# Patient Record
Sex: MALE | Race: WHITE | NOT HISPANIC OR LATINO | Employment: OTHER | URBAN - METROPOLITAN AREA
[De-identification: names, ages, dates, MRNs, and addresses within clinical notes are randomized per-mention and may not be internally consistent; named-entity substitution may affect disease eponyms.]

---

## 2024-02-23 ENCOUNTER — APPOINTMENT (OUTPATIENT)
Dept: GENERAL RADIOLOGY | Facility: HOSPITAL | Age: 69
End: 2024-02-23
Payer: MEDICARE

## 2024-02-23 ENCOUNTER — HOSPITAL ENCOUNTER (EMERGENCY)
Facility: HOSPITAL | Age: 69
Discharge: HOME | End: 2024-02-23
Attending: EMERGENCY MEDICINE
Payer: MEDICARE

## 2024-02-23 VITALS
TEMPERATURE: 97.6 F | DIASTOLIC BLOOD PRESSURE: 97 MMHG | HEART RATE: 69 BPM | SYSTOLIC BLOOD PRESSURE: 152 MMHG | OXYGEN SATURATION: 97 % | RESPIRATION RATE: 16 BRPM

## 2024-02-23 DIAGNOSIS — M25.561 ACUTE PAIN OF RIGHT KNEE: Primary | ICD-10-CM

## 2024-02-23 PROCEDURE — 73560 X-RAY EXAM OF KNEE 1 OR 2: CPT

## 2024-02-23 PROCEDURE — 99283 EMERGENCY DEPT VISIT LOW MDM: CPT

## 2024-02-23 RX ORDER — TADALAFIL 5 MG/1
1 TABLET ORAL DAILY
COMMUNITY
Start: 2024-01-10

## 2024-02-23 RX ORDER — HYDROCODONE BITARTRATE AND ACETAMINOPHEN 5; 325 MG/1; MG/1
1 TABLET ORAL EVERY 6 HOURS PRN
Qty: 12 TABLET | Refills: 0 | Status: SHIPPED | OUTPATIENT
Start: 2024-02-23

## 2024-02-23 RX ORDER — OMEPRAZOLE 20 MG/1
1 CAPSULE, DELAYED RELEASE ORAL DAILY
COMMUNITY
Start: 2023-11-10

## 2024-02-23 RX ORDER — OXYCODONE HYDROCHLORIDE AND ACETAMINOPHEN 5; 325 MG/1; MG/1
1 TABLET ORAL ONCE
Status: COMPLETED | OUTPATIENT
Start: 2024-02-23 | End: 2024-02-23

## 2024-02-23 RX ORDER — UBIDECARENONE 75 MG
100 CAPSULE ORAL DAILY
COMMUNITY

## 2024-02-23 RX ORDER — VALSARTAN 160 MG/1
1 TABLET ORAL DAILY
COMMUNITY
Start: 2024-02-12

## 2024-02-23 RX ADMIN — OXYCODONE HYDROCHLORIDE AND ACETAMINOPHEN 1 TABLET: 5; 325 TABLET ORAL at 10:51

## 2024-02-23 NOTE — ED NOTES
Patient to ER via car from home for R leg pain    Patient reports he turned over in bed last night and heard something snap    Patient reports he can not stand on his leg

## 2024-02-23 NOTE — ED PROVIDER NOTES
EMERGENCY DEPARTMENT ENCOUNTER  Room Number:  14/14  PCP: Provider, No Known  Independent Historians: Patient and Family      HPI:  Chief Complaint: had concerns including Leg Pain.     A complete HPI/ROS/PMH/PSH/SH/FH are unobtainable due to: None    Chronic or social conditions impacting patient care (Social Determinants of Health): None      Context: Sj Genao is a 68 y.o. male with a medical history of GERD, hypertension who presents to the ED c/o acute right knee pain.  The patient reports that he developed right knee pain suddenly overnight.  He states he was in bed and he twisted his knee and felt a pop in his right knee.  He reports he has had severe pain in his right knee since then.  He denies any fall or injury.  He was laying in bed when this occurred.  He denies prior similar episodes.  He has not taken any medicine for his symptoms.  But makes it worse.  Immobilization seems to make it better.  He states that he was unable to walk due to the pain.  He states he felt a pop when this occurred.      Review of prior external notes (non-ED) -and- Review of prior external test results outside of this encounter:  Laboratory evaluation 5/8/2023 shows a BMP with an elevated creatinine of 1.35.  CBC was normal on the same date.    Prescription drug monitoring program review: SREE reviewed by Ishan Villanueva MD       PAST MEDICAL HISTORY  Active Ambulatory Problems     Diagnosis Date Noted    No Active Ambulatory Problems     Resolved Ambulatory Problems     Diagnosis Date Noted    No Resolved Ambulatory Problems     Past Medical History:   Diagnosis Date    Hypertension          PAST SURGICAL HISTORY  History reviewed. No pertinent surgical history.      FAMILY HISTORY  History reviewed. No pertinent family history.      SOCIAL HISTORY  Social History     Socioeconomic History    Marital status:          ALLERGIES  Ciprofloxacin, Shellfish allergy, and Sulfa antibiotics      REVIEW OF  SYSTEMS  Review of Systems  Included in HPI  All systems reviewed and negative except for those discussed in HPI.      PHYSICAL EXAM    I have reviewed the triage vital signs and nursing notes.    ED Triage Vitals [02/23/24 1032]   Temp Heart Rate Resp BP SpO2   97.6 °F (36.4 °C) 69 16 -- 97 %      Temp src Heart Rate Source Patient Position BP Location FiO2 (%)   -- -- -- -- --       Physical Exam  GENERAL: Awake, alert, appears uncomfortable  SKIN: Warm, dry  HENT: Normocephalic, atraumatic  EYES: no scleral icterus  CV: regular rhythm, regular rate  RESPIRATORY: normal effort, lungs clear  ABDOMEN: soft, nontender, nondistended  MUSCULOSKELETAL: no deformity.  Right knee with tenderness inferior medial to the patella.  No erythema.  No warmth.  There is some mild swelling diffusely to the knee.  He has pain with passive and active flexion of the knee.  He has no tenderness in the calf, popliteal fossa, or thigh.  NEURO: alert, moves all extremities, follows commands            LAB RESULTS  No results found for this or any previous visit (from the past 24 hour(s)).      RADIOLOGY  XR Knee 1 or 2 View Right    Result Date: 2/23/2024  Right knee radiograph  HISTORY climbed out of bed felt a pop  TECHNIQUE: AP, lateral radiographs of the right knee  COMPARISON: None      FINDINGS AND IMPRESSION: No significant joint effusion is seen. No findings of fracture. Advanced degenerative changes are present, most notably within the medial compartment..   This report was finalized on 2/23/2024 11:40 AM by Dr. Dany Bryant M.D on Workstation: [id number]         MEDICATIONS GIVEN IN ER  Medications   oxyCODONE-acetaminophen (PERCOCET) 5-325 MG per tablet 1 tablet (1 tablet Oral Given 2/23/24 1051)         ORDERS PLACED DURING THIS VISIT:  Orders Placed This Encounter   Procedures    [facility] 05.  Knee Immobilizer    XR Knee 1 or 2 View Right         OUTPATIENT MEDICATION MANAGEMENT:  No current Epic-ordered  facility-administered medications on file.     Current Outpatient Medications Ordered in Epic   Medication Sig Dispense Refill    omeprazole (priLOSEC) 20 MG capsule Take 1 capsule by mouth Daily.      tadalafil (CIALIS) 5 MG tablet Take 1 tablet by mouth Daily.      valsartan (DIOVAN) 160 MG tablet Take 1 tablet by mouth Daily.      HYDROcodone-acetaminophen (NORCO) 5-325 MG per tablet Take 1 tablet by mouth Every 6 (Six) Hours As Needed for Severe Pain. 12 tablet 0    vitamin B-12 (cyanocobalamin) 100 MCG tablet Take 1 tablet by mouth Daily.           PROCEDURES  Procedures            PROGRESS, DATA ANALYSIS, CONSULTS, AND MEDICAL DECISION MAKING  All labs have been independently interpreted by me.  All radiology studies have been reviewed by me. All EKG's have been independently viewed and interpreted by me.  Discussion below represents my analysis of pertinent findings related to patient's condition, differential diagnosis, treatment plan and final disposition.    Differential diagnosis includes but is not limited to knee sprain, knee strain, meniscus tear, DVT, inflammatory arthritis, infectious arthritis.    Clinical Scores:                   ED Course as of 02/23/24 1156   Fri Feb 23, 2024   1057 XR Knee 1 or 2 View Right  My independent interpretation of the imaging study is right knee x-ray is no gross fracture [TR]   1153 I reviewed the workup and findings with the patient and family at the bedside.  Answered all questions.  He reports significant pain relief with the pain medicine.  He states he was not bearing weight when this occurred.  My suspicion for tibial plateau fracture is very low.  Plan knee immobilizer, pain medicine, outpatient follow-up with orthopedics.  He has seen Dr. Mills in the past.  He has walkers and crutches at home. [TR]      ED Course User Index  [TR] Ishan Villanueva MD             AS OF 11:56 EST VITALS:    BP - 152/97  HR - 69  TEMP - 97.6 °F (36.4 °C)  O2 SATS -  97%    COMPLEXITY OF CARE  Admission was considered but after careful review of the patient's presentation, physical examination, diagnostic results, and response to treatment the patient may be safely discharged with outpatient follow-up.      DIAGNOSIS  Final diagnoses:   Acute pain of right knee         DISPOSITION  ED Disposition       ED Disposition   Discharge    Condition   Stable    Comment   --                Please note that portions of this document were completed with a voice recognition program.    Note Disclaimer: At [facility], we believe that sharing information builds trust and better relationships. You are receiving this note because you recently visited [facility]. It is possible you will see health information before a provider has talked with you about it. This kind of information can be easy to misunderstand. To help you fully understand what it means for your health, we urge you to discuss this note with your provider.         Ishan Villanueva MD  02/23/24 7471

## 2024-02-23 NOTE — DISCHARGE INSTRUCTIONS
Apply ice to the knee for the next 24 hours.  Wear the knee immobilizer to help control movement and help the pain.  Use the pain medicines prescribed for severe pain.  You may use ibuprofen as well to help to control the pain.  Follow-up with Dr. Mills for an appointment to be seen.

## 2024-02-26 ENCOUNTER — OFFICE VISIT (OUTPATIENT)
Dept: ORTHOPEDIC SURGERY | Facility: CLINIC | Age: 69
End: 2024-02-26
Payer: MEDICARE

## 2024-02-26 VITALS — HEIGHT: 72 IN | TEMPERATURE: 98.4 F | BODY MASS INDEX: 29.5 KG/M2 | WEIGHT: 217.8 LBS

## 2024-02-26 DIAGNOSIS — F41.8 TEST ANXIETY: ICD-10-CM

## 2024-02-26 DIAGNOSIS — S89.91XA KNEE INJURY, RIGHT, INITIAL ENCOUNTER: Primary | ICD-10-CM

## 2024-02-26 RX ORDER — DIAZEPAM 10 MG/1
TABLET ORAL
Qty: 1 TABLET | Refills: 0 | Status: SHIPPED | OUTPATIENT
Start: 2024-02-26

## 2024-02-26 NOTE — PROGRESS NOTES
"Patient: Sj Genao  YOB: 1955 68 y.o. male  Medical Record Number: [id number]    Chief Complaints:   Chief Complaint   Patient presents with    Right Knee - Initial Evaluation       History of Present Illness:Sj Genao is a 68 y.o. male who presents as a new patient both myself as well as the practice with acute on set of severe right knee pain and large effusion.  Patient reports he turned over in bed 4 nights ago heard and felt a huge\" pop\" had acute onset of severe pain and large effusion, the pain was so bad he went to the emergency room, they did x-rays, he was told he had arthritis and was referred here.  Patient is currently in a knee immobilizer also using a walker.  He has had some pain off and on through the years but nothing like this.    Allergies:   Allergies   Allergen Reactions    Ciprofloxacin Unknown - High Severity     Other reaction(s): loss of consciousness    Shellfish Allergy Unknown - High Severity    Sulfa Antibiotics Unknown - High Severity     Other reaction(s): Unknown   childhood reactioin       Medications:   Current Outpatient Medications   Medication Sig Dispense Refill    HYDROcodone-acetaminophen (NORCO) 5-325 MG per tablet Take 1 tablet by mouth Every 6 (Six) Hours As Needed for Severe Pain. 12 tablet 0    omeprazole (priLOSEC) 20 MG capsule Take 1 capsule by mouth Daily.      tadalafil (CIALIS) 5 MG tablet Take 1 tablet by mouth Daily.      valsartan (DIOVAN) 160 MG tablet Take 1 tablet by mouth Daily.      vitamin B-12 (cyanocobalamin) 100 MCG tablet Take 1 tablet by mouth Daily.       No current facility-administered medications for this visit.         The following portions of the patient's history were reviewed and updated as appropriate: allergies, current medications, past family history, past medical history, past social history, past surgical history and problem list.    Review of Systems:   14 point review of systems was performed. All systems " "negative except pertinent positives/negatives listed in HPI above    Physical Exam:   Vitals:    02/26/24 0846   Temp: 98.4 °F (36.9 °C)   TempSrc: Temporal   Weight: 98.8 kg (217 lb 12.8 oz)   Height: 182 cm (71.65\")   PainSc:   5   PainLoc: Knee       General: A and O x 3, ASA, NAD   Skin clear no unusual lesions noted  Right knee patient has 1+ effusion noted with 90 degrees flexion neutral in extension with a positive Winifred negative Lockman calf soft and nontender       Radiology:  Xrays 3views (ap,lateral, sunrise) right knee were reviewed that were done previously show bone-on-bone end-stage osteoarthritis with cyst and spur formation    Assessment/Plan: Acute onset severe right knee pain with large effusion    Patient and I discussed options, he would like to proceed with an MRI, obvious concern would be ligamentous tear or disruption given the acute onset of severe pain, will also send a prescription to his pharmacy for Valium to take 1 hour prior to the MRI.  Will continue with knee brace walker and once we get those results back we will contact patient regarding treatment options      Aleah Woodward, APRN  2/26/2024  "

## 2024-02-29 ENCOUNTER — OFFICE VISIT (OUTPATIENT)
Dept: ORTHOPEDIC SURGERY | Facility: CLINIC | Age: 69
End: 2024-02-29
Payer: MEDICARE

## 2024-02-29 VITALS — BODY MASS INDEX: 29.39 KG/M2 | TEMPERATURE: 95.1 F | WEIGHT: 217 LBS | HEIGHT: 72 IN

## 2024-02-29 DIAGNOSIS — G89.29 CHRONIC PAIN OF RIGHT KNEE: Primary | ICD-10-CM

## 2024-02-29 DIAGNOSIS — M25.561 CHRONIC PAIN OF RIGHT KNEE: Primary | ICD-10-CM

## 2024-02-29 DIAGNOSIS — S89.91XA KNEE INJURY, RIGHT, INITIAL ENCOUNTER: Primary | ICD-10-CM

## 2024-02-29 RX ORDER — METHYLPREDNISOLONE ACETATE 80 MG/ML
80 INJECTION, SUSPENSION INTRA-ARTICULAR; INTRALESIONAL; INTRAMUSCULAR; SOFT TISSUE
Status: COMPLETED | OUTPATIENT
Start: 2024-02-29 | End: 2024-02-29

## 2024-02-29 RX ORDER — MELOXICAM 15 MG/1
15 TABLET ORAL DAILY
Qty: 30 TABLET | Refills: 3 | Status: SHIPPED | OUTPATIENT
Start: 2024-02-29

## 2024-02-29 RX ADMIN — METHYLPREDNISOLONE ACETATE 80 MG: 80 INJECTION, SUSPENSION INTRA-ARTICULAR; INTRALESIONAL; INTRAMUSCULAR; SOFT TISSUE at 08:59

## 2024-02-29 NOTE — PROGRESS NOTES
Patient: Sj Genao  YOB: 1955 68 y.o. male  Medical Record Number: [id number]    Chief Complaints:   Chief Complaint   Patient presents with   • Right Knee - Follow-up       History of Present Illness:Sj Genao is a 68 y.o. male who presents for follow-up after right knee injury and subsequent MRI.  The MRI did show complex tearing of the medial meniscus, degenerative changes of the anterior and posterior cruciate ligaments, large joint effusion and tricompartmental osteoarthritis.  Patient has been in a knee immobilizer since injury.  Still having quite a bit of knee pain    Allergies:   Allergies   Allergen Reactions   • Ciprofloxacin Unknown - High Severity     Other reaction(s): loss of consciousness   • Shellfish Allergy Unknown - High Severity   • Sulfa Antibiotics Unknown - High Severity     Other reaction(s): Unknown   childhood reactioin       Medications:   Current Outpatient Medications   Medication Sig Dispense Refill   • diazePAM (Valium) 10 MG tablet Take 1 p.o. 1 hour prior to MRI 1 tablet 0   • HYDROcodone-acetaminophen (NORCO) 5-325 MG per tablet Take 1 tablet by mouth Every 6 (Six) Hours As Needed for Severe Pain. 12 tablet 0   • omeprazole (priLOSEC) 20 MG capsule Take 1 capsule by mouth Daily.     • tadalafil (CIALIS) 5 MG tablet Take 1 tablet by mouth Daily.     • valsartan (DIOVAN) 160 MG tablet Take 1 tablet by mouth Daily.     • vitamin B-12 (cyanocobalamin) 100 MCG tablet Take 1 tablet by mouth Daily.       No current facility-administered medications for this visit.         The following portions of the patient's history were reviewed and updated as appropriate: allergies, current medications, past family history, past medical history, past social history, past surgical history and problem list.    Review of Systems:   14 point review of systems was performed. All systems negative except pertinent positives/negatives listed in HPI above    Physical Exam:   Vitals:     "02/29/24 0828   Temp: 95.1 °F (35.1 °C)   TempSrc: Temporal   Weight: 98.4 kg (217 lb)   Height: 182 cm (71.65\")   PainSc:   5   PainLoc: Knee       General: A and O x 3, ASA, NAD   Skin clear no unusual lesions noted  Right knee patient has trace amount of effusion noted with 110 degrees flexion neutral in extension positive Winifred negative Lockman calf soft and nontender       Radiology:  Xrays 3views (ap,lateral, sunrise) right knee were reviewed as well as MRI and findings are above    Assessment/Plan: Osteoarthritis right knee with recent injury    Patient and I discussed options, we will proceed with right knee aspiration, cortisone injection, physical therapy, prescription given for meloxicam to take as needed, we will see him back as needed.  In addition patient was given knee sleeve today we will continue with ice and elevation    Large Joint Arthrocentesis: R knee  Date/Time: 2/29/2024 8:59 AM  Consent given by: patient  Site marked: site marked  Timeout: Immediately prior to procedure a time out was called to verify the correct patient, procedure, equipment, support staff and site/side marked as required   Supporting Documentation  Indications: pain and joint swelling   Procedure Details  Location: knee - R knee  Preparation: Patient was prepped and draped in the usual sterile fashion  Needle size: 22 G  Approach: anterolateral  Medications administered: 80 mg methylPREDNISolone acetate 80 MG/ML; 2 mL lidocaine (cardiac)  Aspirate amount: 5 mL  Aspirate: bloody  Patient tolerance: patient tolerated the procedure well with no immediate complications         Aleah Woodward, APRN  2/29/2024  "

## 2024-03-01 ENCOUNTER — PATIENT ROUNDING (BHMG ONLY) (OUTPATIENT)
Dept: ORTHOPEDIC SURGERY | Facility: CLINIC | Age: 69
End: 2024-03-01
Payer: MEDICARE

## 2024-03-01 NOTE — PROGRESS NOTES
A Swish Message has been sent to the patient for PATIENT ROUNDING with [facility] [place]